# Patient Record
Sex: MALE | Race: ASIAN | NOT HISPANIC OR LATINO | URBAN - METROPOLITAN AREA
[De-identification: names, ages, dates, MRNs, and addresses within clinical notes are randomized per-mention and may not be internally consistent; named-entity substitution may affect disease eponyms.]

---

## 2018-07-05 ENCOUNTER — EMERGENCY (EMERGENCY)
Facility: HOSPITAL | Age: 53
LOS: 1 days | Discharge: HOME | End: 2018-07-05
Attending: EMERGENCY MEDICINE
Payer: MEDICAID

## 2018-07-05 VITALS
TEMPERATURE: 99 F | HEIGHT: 67 IN | SYSTOLIC BLOOD PRESSURE: 170 MMHG | RESPIRATION RATE: 18 BRPM | DIASTOLIC BLOOD PRESSURE: 89 MMHG | OXYGEN SATURATION: 99 % | WEIGHT: 182.1 LBS | HEART RATE: 92 BPM

## 2018-07-05 PROCEDURE — 99282 EMERGENCY DEPT VISIT SF MDM: CPT

## 2018-07-05 PROCEDURE — 99284 EMERGENCY DEPT VISIT MOD MDM: CPT

## 2018-07-05 NOTE — ED PROVIDER NOTE - CARE PLAN
Principal Discharge DX:	Vitreous detachment, unspecified laterality  Assessment and plan of treatment:	You were found to have a vitreous detachment. Please call [phone] to make an appointment for follow up at the ophthalmology clinic.   If your vision worsens, you start having pain in the eye, difficulty opening/closing the eye, extreme headache or you have any other concerns please come back to the emergency room.

## 2018-07-05 NOTE — ED PROVIDER NOTE - NS ED ROS FT
General: denies fever, chills  HENT: denies nasal congestion, sore throat,   Eyes: +blurred vision, No eye discharge, eye redness  Neck: denies neck pain, neck swelling  CV: denies chest pain, palpitations  Resp: denies difficulty breathing, cough  Abdominal: denies nausea, vomiting, diarrhea, abdominal pain, blood in stool, dark stool  MSK: denies muscle aches, bony pain, leg pain, leg swelling  Neuro: denies headaches, numbness, tingling, dizziness, lightheadedness.  Skin: denies rashes, cuts, bruises  Hematologic: denies unexplained bruises

## 2018-07-05 NOTE — ED PROVIDER NOTE - ATTENDING CONTRIBUTION TO CARE
Attending MD Quiros: I personally have seen and examined this patient.  Resident note reviewed and agree on plan of care and except where noted.  See below for details.     53M with PMH including HTN, HLD, glaucoma presents to the ED with L eye blurry vision since yesterday.  Reports intermittent blurry vision, with some pain behind the eye.  Denies trauma, redness, pruritis, sick contacts, diplopia, sudden loss of vision.  On exam, PERRL, EOMI, confrontational VF full, Va cc OD 20/30, OS 20/25, no periorbital ecchymosis, no tenderness to palpation of orbital rim, lid margins clear, no retained foreign body on lid eversion, no conjunctival injection, no corneal defect, AC no cells or flare, fundus exam very limited, disc margins sharp and flat; A/P: 53M with intermittent blurry vision of L eyes, Ddx includes vitreous detachment, vitreous hemorrhage, lower suspciion for retinal detachment but will consult ophtho

## 2018-07-05 NOTE — ED PROVIDER NOTE - PLAN OF CARE
You were found to have a vitreous detachment. Please call [phone] to make an appointment for follow up at the ophthalmology clinic.   If your vision worsens, you start having pain in the eye, difficulty opening/closing the eye, extreme headache or you have any other concerns please come back to the emergency room.

## 2018-07-05 NOTE — ED ADULT TRIAGE NOTE - CHIEF COMPLAINT QUOTE
c.o left eye blurriness and pain since yesterday. pain is sharp and shooting inside the eye. patient has foggy vision.  called onomatologist  office today, was told to come into the ER.

## 2018-07-05 NOTE — ED PROVIDER NOTE - PHYSICAL EXAMINATION
Vital Signs Last 24 Hrs  T(C): 37.1 (05 Jul 2018 14:29), Max: 37.1 (05 Jul 2018 14:29)  T(F): 98.7 (05 Jul 2018 14:29), Max: 98.7 (05 Jul 2018 14:29)  HR: 92 (05 Jul 2018 14:29) (92 - 92)  BP: 170/89 (05 Jul 2018 14:29) (170/89 - 170/89)  BP(mean): --  RR: 18 (05 Jul 2018 14:29) (18 - 18)  SpO2: 99% (05 Jul 2018 14:29) (99% - 99%)    PE  General: comfortable, NAD  HEENT: EOMI, PERRLA, 20/40 acuity in R eye and 20/50 in L eye. Visual fields intact. R light reflex present b/l.  Cardiovascular: Regular rhythm  Respiratory: lungs CTAB  Abdominal: Soft, nontender  Extremities: FROM x4

## 2018-07-05 NOTE — CONSULT NOTE ADULT - SUBJECTIVE AND OBJECTIVE BOX
[facility] Ophthalmology Consult Note    HPI: 53 y.o M PMH glaucoma, HTN, HLD presents c.o L eye blurred vision in the mornings since yesterday with residual floaters OS. Patient denies trauma. Denies pain or redness. Denies diplopia. Denies flashes.      PMH: above  POcHx:  Denies surgeries, lasers or trauma, follows with an ophthalmologist for glc  Meds: reviewed   Allergies: NKDA    ROS:  General (neg), Vision (per HPI), Head and Neck (neg), Pulm (neg), CV (neg), GI (neg),  (neg), Musculoskeletal (neg), Skin/Integ (neg), Neuro (neg), Endocrine (neg), Heme (neg), All/Immuno (neg)    Mood and Affect Appropriate ( x ),  Oriented to Time, Place, and Person x 3 ( x )    Ophthalmology Exam    Visual acuity (cc): 20/25 OU  Pupils: PERRL OU, no APD  Ttono: 18 OU  Extraocular movements (EOMs): Full OU, no pain, no diplopia   Confrontational Visual Field (CVF):  Full OU  Color Plates: 12/12 OU    SLE:  External:  Flat  Lids/Lashes/Lacrimal Ducts: WNL OU  Sclera/ Conj: W+Q OU  Cornea: Clear OU  Anterior Chamber: D+F OU  Iris:  Flat OU  Lens:  Cl OU    Fundus Exam: dilated with 1% tropicamide and 2.5% phenylephrine  Approval obtained from primary team for dilation  Patient aware that pupils can remained dilated for at least 4-6 hours  Exam performed with 20D lens    Vitreous: wnl OU, neg Felix's sign   Disc, cup/disc: sharp and pink, 0.45 OU  Macula:  wnl OU  Vessels:  wnl OU  Periphery: wnl OU; flat 360 with scleral depression OU      A/P. 53 y.o M with floaters OS. Exam notable for excellent Va. IOP wnl. Neg felix's signs and retina flat 360 with SD. No PVD noted with exam using 20 lens. Likely 2/2 PVD OS  - RD precautions discussed     Follow-Up:  Patient should follow up his/her ophthalmologist or in the [facility] within 4-6 week of discharge, sooner if symptoms worsen or change.  [address] [place] [address]  [phone]    s/d/w Dr. Hollis

## 2018-07-05 NOTE — ED PROVIDER NOTE - MEDICAL DECISION MAKING DETAILS
53M PMH HTN, HLD, Glaucoma p/w floaters and blurred vision in L eye x 1 day. Concerning for vitreous detachment. Ophtho consult.

## 2018-07-05 NOTE — ED PROVIDER NOTE - OBJECTIVE STATEMENT
53M PMH glaucoma, HTN, HLD presents for L eye blurred vision since yesterday. Blurred vision began yesterday when he got out of the shower and comes and goes. He reports that he had a black floater in eye. he reports some pain behind the eye. 53M PMH glaucoma, HTN, HLD presents for L eye blurred vision since yesterday. Blurred vision began yesterday when he got out of the shower and comes and goes. He reports that he had a black floater in eye. he reports some pain behind the eye. No HA, ear pain, nausea, vomiting. No redness. Does not recall any injury to eye.

## 2018-07-05 NOTE — ED ADULT NURSE NOTE - OBJECTIVE STATEMENT
53 year old male presents ambulatory to ED through waiting room complaining of left eye pain and blurriness since waking up yesterday. States that he has been taking Latanoprost othalmic solution drops (which he has been taking for over a year for "high pressure in my eye". States pain is sharp and associated with "foggy" vision. Mild redness. wears reading glasses. denies use of contacts. Denies HA, dizziness, fevers, chills.

## 2018-10-10 ENCOUNTER — APPOINTMENT (OUTPATIENT)
Dept: UROLOGY | Facility: CLINIC | Age: 53
End: 2018-10-10
Payer: MEDICAID

## 2018-10-10 VITALS
RESPIRATION RATE: 16 BRPM | DIASTOLIC BLOOD PRESSURE: 120 MMHG | HEART RATE: 73 BPM | SYSTOLIC BLOOD PRESSURE: 171 MMHG | TEMPERATURE: 98.5 F

## 2018-10-10 DIAGNOSIS — N28.89 OTHER SPECIFIED DISORDERS OF KIDNEY AND URETER: ICD-10-CM

## 2018-10-10 PROCEDURE — 99203 OFFICE O/P NEW LOW 30 MIN: CPT

## 2018-10-12 LAB
CREAT SERPL-MCNC: 0.97 MG/DL
PSA SERPL-MCNC: 1.44 NG/ML

## 2018-10-19 ENCOUNTER — FORM ENCOUNTER (OUTPATIENT)
Age: 53
End: 2018-10-19

## 2018-10-20 ENCOUNTER — OUTPATIENT (OUTPATIENT)
Dept: OUTPATIENT SERVICES | Facility: HOSPITAL | Age: 53
LOS: 1 days | End: 2018-10-20
Payer: MEDICAID

## 2018-10-20 ENCOUNTER — APPOINTMENT (OUTPATIENT)
Dept: MRI IMAGING | Facility: IMAGING CENTER | Age: 53
End: 2018-10-20
Payer: MEDICAID

## 2018-10-20 DIAGNOSIS — N28.89 OTHER SPECIFIED DISORDERS OF KIDNEY AND URETER: ICD-10-CM

## 2018-10-20 DIAGNOSIS — N28.1 CYST OF KIDNEY, ACQUIRED: ICD-10-CM

## 2018-10-20 PROCEDURE — 74183 MRI ABD W/O CNTR FLWD CNTR: CPT | Mod: 26

## 2018-10-20 PROCEDURE — A9585: CPT

## 2018-10-20 PROCEDURE — 82565 ASSAY OF CREATININE: CPT

## 2018-10-20 PROCEDURE — 74183 MRI ABD W/O CNTR FLWD CNTR: CPT

## 2018-10-24 ENCOUNTER — APPOINTMENT (OUTPATIENT)
Dept: UROLOGY | Facility: CLINIC | Age: 53
End: 2018-10-24
Payer: MEDICAID

## 2018-10-24 VITALS — HEIGHT: 67 IN | WEIGHT: 178 LBS | BODY MASS INDEX: 27.94 KG/M2

## 2018-10-24 DIAGNOSIS — N28.1 CYST OF KIDNEY, ACQUIRED: ICD-10-CM

## 2018-10-24 DIAGNOSIS — R35.0 FREQUENCY OF MICTURITION: ICD-10-CM

## 2018-10-24 PROCEDURE — 51798 US URINE CAPACITY MEASURE: CPT

## 2018-10-24 PROCEDURE — 99213 OFFICE O/P EST LOW 20 MIN: CPT | Mod: 25

## 2018-11-07 ENCOUNTER — APPOINTMENT (OUTPATIENT)
Dept: UROLOGY | Facility: CLINIC | Age: 53
End: 2018-11-07
Payer: MEDICAID

## 2018-11-07 DIAGNOSIS — Q53.111 UNILATERAL INTRAABDOMINAL TESTIS: ICD-10-CM

## 2018-11-07 PROCEDURE — 99213 OFFICE O/P EST LOW 20 MIN: CPT

## 2018-11-15 ENCOUNTER — MOBILE ON CALL (OUTPATIENT)
Age: 53
End: 2018-11-15

## 2018-11-18 ENCOUNTER — APPOINTMENT (OUTPATIENT)
Dept: MRI IMAGING | Facility: IMAGING CENTER | Age: 53
End: 2018-11-18

## 2018-12-01 ENCOUNTER — FORM ENCOUNTER (OUTPATIENT)
Age: 53
End: 2018-12-01

## 2018-12-02 ENCOUNTER — OUTPATIENT (OUTPATIENT)
Dept: OUTPATIENT SERVICES | Facility: HOSPITAL | Age: 53
LOS: 1 days | End: 2018-12-02
Payer: MEDICAID

## 2018-12-02 ENCOUNTER — APPOINTMENT (OUTPATIENT)
Dept: MRI IMAGING | Facility: IMAGING CENTER | Age: 53
End: 2018-12-02
Payer: MEDICAID

## 2018-12-02 DIAGNOSIS — Q53.111 UNILATERAL INTRAABDOMINAL TESTIS: ICD-10-CM

## 2018-12-02 PROCEDURE — 82565 ASSAY OF CREATININE: CPT

## 2018-12-02 PROCEDURE — 72197 MRI PELVIS W/O & W/DYE: CPT

## 2018-12-02 PROCEDURE — A9585: CPT

## 2018-12-02 PROCEDURE — 72197 MRI PELVIS W/O & W/DYE: CPT | Mod: 26

## 2019-03-22 ENCOUNTER — OUTPATIENT (OUTPATIENT)
Dept: OUTPATIENT SERVICES | Facility: HOSPITAL | Age: 54
LOS: 1 days | End: 2019-03-22

## 2020-12-16 ENCOUNTER — EMERGENCY (EMERGENCY)
Facility: HOSPITAL | Age: 55
LOS: 1 days | Discharge: HOME | End: 2020-12-16
Attending: EMERGENCY MEDICINE
Payer: MEDICAID

## 2020-12-16 VITALS
SYSTOLIC BLOOD PRESSURE: 173 MMHG | OXYGEN SATURATION: 98 % | TEMPERATURE: 98 F | HEIGHT: 67 IN | HEART RATE: 84 BPM | WEIGHT: 179.9 LBS | RESPIRATION RATE: 16 BRPM | DIASTOLIC BLOOD PRESSURE: 110 MMHG

## 2020-12-16 VITALS
TEMPERATURE: 98 F | SYSTOLIC BLOOD PRESSURE: 147 MMHG | DIASTOLIC BLOOD PRESSURE: 103 MMHG | OXYGEN SATURATION: 99 % | HEART RATE: 98 BPM | RESPIRATION RATE: 19 BRPM

## 2020-12-16 LAB
ALBUMIN SERPL ELPH-MCNC: 4.6 G/DL — SIGNIFICANT CHANGE UP (ref 3.3–5)
ALP SERPL-CCNC: 47 U/L — SIGNIFICANT CHANGE UP (ref 40–120)
ALT FLD-CCNC: 25 U/L — SIGNIFICANT CHANGE UP (ref 10–45)
ANION GAP SERPL CALC-SCNC: 13 MMOL/L — SIGNIFICANT CHANGE UP (ref 5–17)
APPEARANCE UR: ABNORMAL
AST SERPL-CCNC: 18 U/L — SIGNIFICANT CHANGE UP (ref 10–40)
BACTERIA # UR AUTO: NEGATIVE — SIGNIFICANT CHANGE UP
BASE EXCESS BLDV CALC-SCNC: 1 MMOL/L — SIGNIFICANT CHANGE UP (ref -2–2)
BASOPHILS # BLD AUTO: 0 K/UL — SIGNIFICANT CHANGE UP (ref 0–0.2)
BASOPHILS NFR BLD AUTO: 0 % — SIGNIFICANT CHANGE UP (ref 0–2)
BILIRUB SERPL-MCNC: 0.4 MG/DL — SIGNIFICANT CHANGE UP (ref 0.2–1.2)
BILIRUB UR-MCNC: NEGATIVE — SIGNIFICANT CHANGE UP
BUN SERPL-MCNC: 20 MG/DL — SIGNIFICANT CHANGE UP (ref 7–23)
CA-I SERPL-SCNC: 1.22 MMOL/L — SIGNIFICANT CHANGE UP (ref 1.12–1.3)
CALCIUM SERPL-MCNC: 10.3 MG/DL — SIGNIFICANT CHANGE UP (ref 8.4–10.5)
CHLORIDE BLDV-SCNC: 107 MMOL/L — SIGNIFICANT CHANGE UP (ref 96–108)
CHLORIDE SERPL-SCNC: 102 MMOL/L — SIGNIFICANT CHANGE UP (ref 96–108)
CO2 BLDV-SCNC: 26 MMOL/L — SIGNIFICANT CHANGE UP (ref 22–30)
CO2 SERPL-SCNC: 22 MMOL/L — SIGNIFICANT CHANGE UP (ref 22–31)
COLOR SPEC: COLORLESS — SIGNIFICANT CHANGE UP
CREAT SERPL-MCNC: 1.06 MG/DL — SIGNIFICANT CHANGE UP (ref 0.5–1.3)
DIFF PNL FLD: NEGATIVE — SIGNIFICANT CHANGE UP
EOSINOPHIL # BLD AUTO: 0 K/UL — SIGNIFICANT CHANGE UP (ref 0–0.5)
EOSINOPHIL NFR BLD AUTO: 0 % — SIGNIFICANT CHANGE UP (ref 0–6)
EPI CELLS # UR: 0 /HPF — SIGNIFICANT CHANGE UP
GAS PNL BLDV: 138 MMOL/L — SIGNIFICANT CHANGE UP (ref 135–145)
GAS PNL BLDV: SIGNIFICANT CHANGE UP
GAS PNL BLDV: SIGNIFICANT CHANGE UP
GIANT PLATELETS BLD QL SMEAR: PRESENT — SIGNIFICANT CHANGE UP
GLUCOSE BLDV-MCNC: 119 MG/DL — HIGH (ref 70–99)
GLUCOSE SERPL-MCNC: 120 MG/DL — HIGH (ref 70–99)
GLUCOSE UR QL: NEGATIVE — SIGNIFICANT CHANGE UP
HCO3 BLDV-SCNC: 24 MMOL/L — SIGNIFICANT CHANGE UP (ref 21–29)
HCT VFR BLD CALC: 38 % — LOW (ref 39–50)
HCT VFR BLDA CALC: 40 % — SIGNIFICANT CHANGE UP (ref 39–50)
HGB BLD CALC-MCNC: 13 G/DL — SIGNIFICANT CHANGE UP (ref 13–17)
HGB BLD-MCNC: 12.5 G/DL — LOW (ref 13–17)
HYALINE CASTS # UR AUTO: 0 /LPF — SIGNIFICANT CHANGE UP (ref 0–2)
KETONES UR-MCNC: NEGATIVE — SIGNIFICANT CHANGE UP
LACTATE BLDV-MCNC: 2.6 MMOL/L — HIGH (ref 0.7–2)
LACTATE BLDV-MCNC: 2.6 MMOL/L — HIGH (ref 0.7–2)
LEUKOCYTE ESTERASE UR-ACNC: NEGATIVE — SIGNIFICANT CHANGE UP
LIDOCAIN IGE QN: 51 U/L — SIGNIFICANT CHANGE UP (ref 7–60)
LYMPHOCYTES # BLD AUTO: 2.8 K/UL — SIGNIFICANT CHANGE UP (ref 1–3.3)
LYMPHOCYTES # BLD AUTO: 30.1 % — SIGNIFICANT CHANGE UP (ref 13–44)
MANUAL SMEAR VERIFICATION: SIGNIFICANT CHANGE UP
MCHC RBC-ENTMCNC: 23.9 PG — LOW (ref 27–34)
MCHC RBC-ENTMCNC: 32.9 GM/DL — SIGNIFICANT CHANGE UP (ref 32–36)
MCV RBC AUTO: 72.7 FL — LOW (ref 80–100)
MONOCYTES # BLD AUTO: 0.58 K/UL — SIGNIFICANT CHANGE UP (ref 0–0.9)
MONOCYTES NFR BLD AUTO: 6.2 % — SIGNIFICANT CHANGE UP (ref 2–14)
NEUTROPHILS # BLD AUTO: 5.92 K/UL — SIGNIFICANT CHANGE UP (ref 1.8–7.4)
NEUTROPHILS NFR BLD AUTO: 63.7 % — SIGNIFICANT CHANGE UP (ref 43–77)
NITRITE UR-MCNC: NEGATIVE — SIGNIFICANT CHANGE UP
PCO2 BLDV: 37 MMHG — SIGNIFICANT CHANGE UP (ref 35–50)
PH BLDV: 7.44 — SIGNIFICANT CHANGE UP (ref 7.35–7.45)
PH UR: 7.5 — SIGNIFICANT CHANGE UP (ref 5–8)
PLAT MORPH BLD: ABNORMAL
PLATELET # BLD AUTO: 216 K/UL — SIGNIFICANT CHANGE UP (ref 150–400)
PO2 BLDV: 30 MMHG — SIGNIFICANT CHANGE UP (ref 25–45)
POIKILOCYTOSIS BLD QL AUTO: SLIGHT — SIGNIFICANT CHANGE UP
POTASSIUM BLDV-SCNC: 4 MMOL/L — SIGNIFICANT CHANGE UP (ref 3.5–5.3)
POTASSIUM SERPL-MCNC: 3.8 MMOL/L — SIGNIFICANT CHANGE UP (ref 3.5–5.3)
POTASSIUM SERPL-SCNC: 3.8 MMOL/L — SIGNIFICANT CHANGE UP (ref 3.5–5.3)
PROT SERPL-MCNC: 7.6 G/DL — SIGNIFICANT CHANGE UP (ref 6–8.3)
PROT UR-MCNC: NEGATIVE — SIGNIFICANT CHANGE UP
RBC # BLD: 5.23 M/UL — SIGNIFICANT CHANGE UP (ref 4.2–5.8)
RBC # FLD: 14.3 % — SIGNIFICANT CHANGE UP (ref 10.3–14.5)
RBC BLD AUTO: SIGNIFICANT CHANGE UP
RBC CASTS # UR COMP ASSIST: 2 /HPF — SIGNIFICANT CHANGE UP (ref 0–4)
SAO2 % BLDV: 55 % — LOW (ref 67–88)
SODIUM SERPL-SCNC: 137 MMOL/L — SIGNIFICANT CHANGE UP (ref 135–145)
SP GR SPEC: 1.01 — SIGNIFICANT CHANGE UP (ref 1.01–1.02)
UROBILINOGEN FLD QL: NEGATIVE — SIGNIFICANT CHANGE UP
WBC # BLD: 9.29 K/UL — SIGNIFICANT CHANGE UP (ref 3.8–10.5)
WBC # FLD AUTO: 9.29 K/UL — SIGNIFICANT CHANGE UP (ref 3.8–10.5)
WBC UR QL: 0 /HPF — SIGNIFICANT CHANGE UP (ref 0–5)

## 2020-12-16 PROCEDURE — 74176 CT ABD & PELVIS W/O CONTRAST: CPT

## 2020-12-16 PROCEDURE — 87086 URINE CULTURE/COLONY COUNT: CPT

## 2020-12-16 PROCEDURE — 80053 COMPREHEN METABOLIC PANEL: CPT

## 2020-12-16 PROCEDURE — 85014 HEMATOCRIT: CPT

## 2020-12-16 PROCEDURE — 82947 ASSAY GLUCOSE BLOOD QUANT: CPT

## 2020-12-16 PROCEDURE — 82330 ASSAY OF CALCIUM: CPT

## 2020-12-16 PROCEDURE — 96374 THER/PROPH/DIAG INJ IV PUSH: CPT

## 2020-12-16 PROCEDURE — 99285 EMERGENCY DEPT VISIT HI MDM: CPT

## 2020-12-16 PROCEDURE — 83605 ASSAY OF LACTIC ACID: CPT

## 2020-12-16 PROCEDURE — 74176 CT ABD & PELVIS W/O CONTRAST: CPT | Mod: 26

## 2020-12-16 PROCEDURE — 83690 ASSAY OF LIPASE: CPT

## 2020-12-16 PROCEDURE — 82803 BLOOD GASES ANY COMBINATION: CPT

## 2020-12-16 PROCEDURE — 82435 ASSAY OF BLOOD CHLORIDE: CPT

## 2020-12-16 PROCEDURE — 84132 ASSAY OF SERUM POTASSIUM: CPT

## 2020-12-16 PROCEDURE — 81001 URINALYSIS AUTO W/SCOPE: CPT

## 2020-12-16 PROCEDURE — 84295 ASSAY OF SERUM SODIUM: CPT

## 2020-12-16 PROCEDURE — 96361 HYDRATE IV INFUSION ADD-ON: CPT

## 2020-12-16 PROCEDURE — 99284 EMERGENCY DEPT VISIT MOD MDM: CPT | Mod: 25

## 2020-12-16 PROCEDURE — 96375 TX/PRO/DX INJ NEW DRUG ADDON: CPT

## 2020-12-16 PROCEDURE — 85025 COMPLETE CBC W/AUTO DIFF WBC: CPT

## 2020-12-16 PROCEDURE — 85018 HEMOGLOBIN: CPT

## 2020-12-16 RX ORDER — TAMSULOSIN HYDROCHLORIDE 0.4 MG/1
1 CAPSULE ORAL
Qty: 7 | Refills: 0
Start: 2020-12-16 | End: 2020-12-22

## 2020-12-16 RX ORDER — IBUPROFEN 200 MG
1 TABLET ORAL
Qty: 9 | Refills: 0
Start: 2020-12-16 | End: 2020-12-18

## 2020-12-16 RX ORDER — MORPHINE SULFATE 50 MG/1
4 CAPSULE, EXTENDED RELEASE ORAL ONCE
Refills: 0 | Status: DISCONTINUED | OUTPATIENT
Start: 2020-12-16 | End: 2020-12-16

## 2020-12-16 RX ORDER — SODIUM CHLORIDE 9 MG/ML
1000 INJECTION INTRAMUSCULAR; INTRAVENOUS; SUBCUTANEOUS ONCE
Refills: 0 | Status: DISCONTINUED | OUTPATIENT
Start: 2020-12-16 | End: 2020-12-16

## 2020-12-16 RX ORDER — KETOROLAC TROMETHAMINE 30 MG/ML
15 SYRINGE (ML) INJECTION ONCE
Refills: 0 | Status: DISCONTINUED | OUTPATIENT
Start: 2020-12-16 | End: 2020-12-16

## 2020-12-16 RX ORDER — ACETAMINOPHEN 500 MG
975 TABLET ORAL ONCE
Refills: 0 | Status: COMPLETED | OUTPATIENT
Start: 2020-12-16 | End: 2020-12-16

## 2020-12-16 RX ORDER — SODIUM CHLORIDE 9 MG/ML
1000 INJECTION, SOLUTION INTRAVENOUS ONCE
Refills: 0 | Status: COMPLETED | OUTPATIENT
Start: 2020-12-16 | End: 2020-12-16

## 2020-12-16 RX ORDER — ONDANSETRON 8 MG/1
4 TABLET, FILM COATED ORAL ONCE
Refills: 0 | Status: COMPLETED | OUTPATIENT
Start: 2020-12-16 | End: 2020-12-16

## 2020-12-16 RX ADMIN — MORPHINE SULFATE 4 MILLIGRAM(S): 50 CAPSULE, EXTENDED RELEASE ORAL at 08:45

## 2020-12-16 RX ADMIN — MORPHINE SULFATE 4 MILLIGRAM(S): 50 CAPSULE, EXTENDED RELEASE ORAL at 08:00

## 2020-12-16 RX ADMIN — Medication 15 MILLIGRAM(S): at 08:45

## 2020-12-16 RX ADMIN — Medication 15 MILLIGRAM(S): at 11:23

## 2020-12-16 RX ADMIN — SODIUM CHLORIDE 1000 MILLILITER(S): 9 INJECTION, SOLUTION INTRAVENOUS at 08:45

## 2020-12-16 RX ADMIN — SODIUM CHLORIDE 1000 MILLILITER(S): 9 INJECTION, SOLUTION INTRAVENOUS at 07:54

## 2020-12-16 RX ADMIN — ONDANSETRON 4 MILLIGRAM(S): 8 TABLET, FILM COATED ORAL at 08:00

## 2020-12-16 RX ADMIN — SODIUM CHLORIDE 1000 MILLILITER(S): 9 INJECTION, SOLUTION INTRAVENOUS at 09:50

## 2020-12-16 RX ADMIN — SODIUM CHLORIDE 1000 MILLILITER(S): 9 INJECTION, SOLUTION INTRAVENOUS at 08:48

## 2020-12-16 NOTE — ED PROVIDER NOTE - PROGRESS NOTE DETAILS
CT findings noted. Patient with episode of emesis and worsening pain. Morphine/Zofran given at this time. Urology consulted. Urology noting no acute intervention at this time but patient can follow up outpatient with clinic. Patient pain improved. Tolerating PO. stable for discharge at this time. Patient to follow up with urology and patient verbalizes understanding of plan for follow up. Strict return precautions given. Motrin/Tamsulosin sent to pharmacy.

## 2020-12-16 NOTE — ED PROVIDER NOTE - CARE PLAN
Principal Discharge DX:	Left flank pain  Assessment and plan of treatment:	55 M with hx as above presenting for left flank pain starting today. Given hx and findings concern for pyelonephritis vs nephrolithiasis at this time. Will get labs, UA, CT and pain control at this time.   Principal Discharge DX:	Ureteral stone with hydronephrosis  Assessment and plan of treatment:	55 M with hx as above presenting for left flank pain starting today. Given hx and findings concern for pyelonephritis vs nephrolithiasis at this time. Will get labs, UA, CT and pain control at this time.

## 2020-12-16 NOTE — ED PROVIDER NOTE - PHYSICAL EXAMINATION
· Physical Examination: PHYSICAL EXAM:   · CONSTITUTIONAL:  Appearance: uncomfortable appearing.  Development: well developed.    · Manner: appropriate for situation.  Mentation: awake, alert, oriented to person, place, time/situation  · Mood: appropriate.  Nourishment: well  · Head Shape: normal cephalic, ATRAUMATIC  · EYES: bilateral normal, no discharge, redness or evidence of any abnormality  · Nose: clear Mouth: normal mucosa  · Throat: uvula midline, no vesicles, no redness, and no oropharyngeal exudate.  · CARDIAC:  CARDIAC RHYTHM: regular  CARDIAC RATE: normal  CARDIAC PEDAL EDEMA: absent  CARDIAC JVD: non-distended bilaterally  · CARDIAC PULSES: normal bilaterally  · RESPIRATORY:  Respiratory Distress: no  Breath Sounds: normal  · Chest Exam: normal, non-tender  · Abdominal Exam: soft, nondistended, nontender. Left CVA ttp. Negative leroy sign.   · MUSCULOSKELETAL: Spine appears normal, range of motion is not limited, no muscle or joint tenderness  · NEUROLOGICAL: Alert and oriented, no focal deficits, no motor or sensory deficits.  · SKIN: Skin normal color for race, warm, dry and intact. No evidence of rash.  · PSYCHIATRIC: Alert and oriented to person, place, time/situation. normal mood and affect. no apparent risk to self or others.  · HEME LYMPH: No adenopathy or splenomegaly. No cervical or inguinal lymphadenopathy.

## 2020-12-16 NOTE — ED PROVIDER NOTE - PATIENT PORTAL LINK FT
You can access the FollowMyHealth Patient Portal offered by [facility] by registering at the following website: [email or web address]. By joining [employer]’s FollowMyHealth portal, you will also be able to view your health information using other applications (apps) compatible with our system.

## 2020-12-16 NOTE — ED ADULT NURSE NOTE - NSFALLRSKUNASSIST_ED_ALL_ED
Changed Hussain to Metoprolol due to shortage on Atenolol. Discussed change with pharmacist and Hussain. Hussain will check his blood pressure after starting this new med and notify me of any concerns.  He would like to transition to our [facility] for Transplant care.     no

## 2020-12-16 NOTE — ED PROVIDER NOTE - NSFOLLOWUPINSTRUCTIONS_ED_ALL_ED_FT
You were evaluated in the Emergency Department for left flank pain.  You were evaluated and examined by a physician, and you had labs and CT scan.     Based on your evaluation: Kidney stone     There are no signs of emergency conditions requiring admission to the hospital on today's workup.  Based on the evaluation, a presumptive diagnosis was made, however, further evaluation may be required by your primary care physician or a specialist for a more definitive diagnosis.  Therefore, please follow-up as directed or return to the Emergency Department if your symptoms change or worsen.    We recommend that you:  1. See your primary care physician within the next 72 hours for follow up.  Bring a copy of your discharge paperwork (including any test results) to your doctor.  2. please see urologist listed below within next 72 hours.   3. Please take motrin for pain control every 8 hours.   4. Please take tamsulosin nightly as prescribed.       *** Return immediately if you have inability to tolerate food or water consumption, or any other new/concerning symptoms. ***

## 2020-12-16 NOTE — ED PROVIDER NOTE - OBJECTIVE STATEMENT
The patient is a 55 M with hx of HTN presenting for left sided sharp radiating to left side pain x 3 hours. Symptoms started suddenly at rest, have been constant and worsening, with no worsening or alleviating factors. No medications taken. Patient notes associated nausea and 1 week worth of urinary frequency/urgency. Patient denies chest pain, sob, fever, chills, headache, emesis, diarrhea, abdominal pain, hematuria/dysuria or lower extremity swelling. No hx of nephrolithiasis.

## 2020-12-16 NOTE — ED PROVIDER NOTE - NS ED ROS FT
Constitutional: No fever or chills  Eyes: No visual changes, eye pain or redness  HEENT: No throat pain, ear pain, nasal pain. No nose bleeding.  CV: No chest pain or lower extremity edema  Resp: No SOB no cough  GI: (+) abd pain. (+) nausea. No vomiting. No diarrhea. No constipation.   : (+) urinary frequency/urgency. No dysuria, hematuria.   MSK: No musculoskeletal pain  Skin: No rash  Neuro: No headache. No numbness or tingling. No weakness.

## 2020-12-16 NOTE — ED PROVIDER NOTE - NSFOLLOWUPCLINICS_GEN_ALL_ED_FT
[facility]  Urology  [address] [place] [address] - [address] [place] [address]  Phone: [phone]  Fax:   Follow Up Time: 1-3 Days

## 2020-12-16 NOTE — ED PROVIDER NOTE - CLINICAL SUMMARY MEDICAL DECISION MAKING FREE TEXT BOX
Attending MD Fuentes: Pt with flank pain, benign abd exam, suspected ureteral colic. Plan for CT a/p to confirm ureteral stone, screening labs, UA, analgesia and reassess       *The above represents an initial assessment/impression. Please refer to progress notes for potential changes in patient clinical course*

## 2020-12-16 NOTE — ED PROVIDER NOTE - ATTENDING CONTRIBUTION TO CARE
Attending MD Fuentes:  I personally have seen and examined this patient.  Resident note reviewed and agree on plan of care and except where noted.  See HPI, PE, and MDM for details.

## 2020-12-16 NOTE — ED PROVIDER NOTE - PLAN OF CARE
55 M with hx as above presenting for left flank pain starting today. Given hx and findings concern for pyelonephritis vs nephrolithiasis at this time. Will get labs, UA, CT and pain control at this time.

## 2020-12-16 NOTE — ED ADULT NURSE NOTE - OBJECTIVE STATEMENT
54 y/o M, PMH HTN, presents to ED for sudden onset of L flank pain that woke him from sleep with nausea/vomiting x 1 since 0430, pt has not taken any meds for the pain. Reports 1 week of urinary urgency/frequency, denies fevers, chills. No hx kidney stones. Pain 8/10 on arrival to ED, pt vomited x 1 after taking sip of water for Tylenol, MD aware and pt re-medicated as per orders. Safety maintained, call bell within reach.

## 2020-12-17 LAB
CULTURE RESULTS: NO GROWTH — SIGNIFICANT CHANGE UP
SPECIMEN SOURCE: SIGNIFICANT CHANGE UP

## 2020-12-23 ENCOUNTER — APPOINTMENT (OUTPATIENT)
Dept: UROLOGY | Facility: CLINIC | Age: 55
End: 2020-12-23
Payer: MEDICAID

## 2020-12-23 PROCEDURE — 99214 OFFICE O/P EST MOD 30 MIN: CPT | Mod: 95

## 2021-01-12 NOTE — HISTORY OF PRESENT ILLNESS
[Home] : at home, [unfilled] , at the time of the visit. [Other Location: e.g. Home (Enter Location, City,State)___] : at [unfilled] [Friend] : friend [FreeTextEntry1] : Pt.had an episode of renal colic on Dec 16th and went to ER at [facility]\par CT scan showed 4 mm stone at left  UVJ\par At present pain free . \par Uncertain whether he passed his stone.\par Initiallt he had marked frequency and nocturia x6\par Now nocturia x1 \par Stream is fairly strong\par Last PSA october 2018 0.97\par He has mild obstructive voiding symptoms but I do not want to suggest treatment until the stone\par  issue has cleared.\par He needs LL and TEB in 3 weeks [Other Location: e.g. School (Enter Location, City,State)___] : at [unfilled], at the time of the visit.

## 2021-01-13 ENCOUNTER — APPOINTMENT (OUTPATIENT)
Dept: UROLOGY | Facility: CLINIC | Age: 56
End: 2021-01-13
Payer: MEDICAID

## 2021-01-13 DIAGNOSIS — N20.0 CALCULUS OF KIDNEY: ICD-10-CM

## 2021-01-13 PROCEDURE — 99214 OFFICE O/P EST MOD 30 MIN: CPT | Mod: 95

## 2021-01-20 LAB — NIDUS STONE QN: NORMAL

## 2021-08-02 ENCOUNTER — OUTPATIENT (OUTPATIENT)
Dept: OUTPATIENT SERVICES | Facility: HOSPITAL | Age: 56
LOS: 1 days | End: 2021-08-02
Payer: MEDICAID

## 2021-08-02 ENCOUNTER — APPOINTMENT (OUTPATIENT)
Dept: UROLOGY | Facility: CLINIC | Age: 56
End: 2021-08-02
Payer: MEDICAID

## 2021-08-02 VITALS
WEIGHT: 187 LBS | HEIGHT: 67 IN | SYSTOLIC BLOOD PRESSURE: 155 MMHG | HEART RATE: 83 BPM | RESPIRATION RATE: 18 BRPM | TEMPERATURE: 98 F | BODY MASS INDEX: 29.35 KG/M2 | DIASTOLIC BLOOD PRESSURE: 99 MMHG

## 2021-08-02 DIAGNOSIS — Z87.442 PERSONAL HISTORY OF URINARY CALCULI: ICD-10-CM

## 2021-08-02 DIAGNOSIS — N13.8 BENIGN PROSTATIC HYPERPLASIA WITH LOWER URINARY TRACT SYMPMS: ICD-10-CM

## 2021-08-02 DIAGNOSIS — R35.0 FREQUENCY OF MICTURITION: ICD-10-CM

## 2021-08-02 DIAGNOSIS — N40.1 BENIGN PROSTATIC HYPERPLASIA WITH LOWER URINARY TRACT SYMPMS: ICD-10-CM

## 2021-08-02 PROCEDURE — 76775 US EXAM ABDO BACK WALL LIM: CPT | Mod: 26

## 2021-08-02 PROCEDURE — 99213 OFFICE O/P EST LOW 20 MIN: CPT

## 2021-08-02 PROCEDURE — 76775 US EXAM ABDO BACK WALL LIM: CPT

## 2021-08-02 NOTE — HISTORY OF PRESENT ILLNESS
[FreeTextEntry1] : The patient is a 56 year old male presenting today for a renal US for a history of kidney stones.\par He reports nocturia one time per night. \par He reports an occasional weak stream, but denies intermittency.\par \par His renal US from 8/2/21 showed:\par Right kidney: 12.3 x 4.8 x 5.3 cm \par Cortical Thickness: UP 2.3 cm , LP 2.0 cm \par \par Left kidney: 11.5 x 4.5 x 4.1 cm\par Cortical Thickness: UP 1.3 cm , LP 1.5 cm \par \par Echogenicity: Normal\par \par Bladder: Not visualized\par \par Findings: There is a 1.5 x 1.6 cm simple cyst in the mid pole and a 1.7 x 1.4 cm cyst with a thin septation in the upper pole of the right kidney, both with no flow. There are two punctate calculi in the left kidney, 2.0 mm lower pole and 4.3 mm upper pole, both with only twinkle artifact. Both kidneys are normal in size and echogenicity without hydronephrosis or solid masses visualized\par \par Blood work today includes prostate Ca screen.\par \par The patient will complete a LL. \par He should also complete a uroflow in one month. \par \par The patient will return to the office in one month to review LL and uroflow study.

## 2021-08-02 NOTE — ASSESSMENT
[FreeTextEntry1] : The patient is a 56 year old male presenting today for a renal US for a history of kidney stones.\par He reports nocturia one time per night. \par He reports an occasional weak stream, but denies intermittency.\par \par His renal US from 8/2/21 showed:\par Right kidney: 12.3 x 4.8 x 5.3 cm \par Cortical Thickness: UP 2.3 cm , LP 2.0 cm \par \par Left kidney: 11.5 x 4.5 x 4.1 cm\par Cortical Thickness: UP 1.3 cm , LP 1.5 cm \par \par Echogenicity: Normal\par \par Bladder: Not visualized\par \par Findings: There is a 1.5 x 1.6 cm simple cyst in the mid pole and a 1.7 x 1.4 cm cyst with a thin septation in the upper pole of the right kidney, both with no flow. There are two punctate calculi in the left kidney, 2.0 mm lower pole and 4.3 mm upper pole, both with only twinkle artifact. Both kidneys are normal in size and echogenicity without hydronephrosis or solid masses visualized\par \par Blood work today includes prostate Ca screen.\par \par The patient will complete a LL. \par He should also complete a uroflow in one month. \par \par The patient will return to the office in one month to review LL and uroflow study. \par \par I spent 15 minutes with the patient.

## 2021-09-10 ENCOUNTER — APPOINTMENT (OUTPATIENT)
Dept: UROLOGY | Facility: CLINIC | Age: 56
End: 2021-09-10
Payer: MEDICAID

## 2021-09-10 PROCEDURE — 51798 US URINE CAPACITY MEASURE: CPT

## 2021-09-10 PROCEDURE — 99212 OFFICE O/P EST SF 10 MIN: CPT

## 2021-09-10 NOTE — HISTORY OF PRESENT ILLNESS
[FreeTextEntry1] : The patient is a 56 year old male presenting today to review LL results and PVR.\par He reports he takes a homeopathic medication from [facility]. \par \par His Litholink from 8/18/21 showed very high calcium, SS CaOx, and low citrate. \par \par His PVR was 0 mL. \par \par I recommend the patient stop taking the medication from [facility] for one month and then complete a LL. \par He should also drink orange juice in the morning and lemonade throughout the day.\par I requested that he brings the homeopathic medication in so I can assess it. \par \par The patient will complete a LL in one month. \par \par The patient will return to the office in 2 months.

## 2021-09-10 NOTE — ASSESSMENT
[FreeTextEntry1] : The patient is a 56 year old male presenting today to review LL results and PVR.\par He reports he takes a homeopathic medication from [facility]. \par \par His Litholink from 8/18/21 showed very high calcium, SS CaOx, and low citrate. \par \par His PVR was 0 mL. \par \par I recommend the patient stop taking the medication from [facility] for one month and then complete a LL. \par He should also drink orange juice in the morning and lemonade throughout the day.\par I requested that he brings the homeopathic medication in so I can assess it. \par \par The patient will complete a LL in one month. \par \par The patient will return to the office in 2 months. \par \par I spent 15 minutes with the patient.

## 2021-12-20 ENCOUNTER — OUTPATIENT (OUTPATIENT)
Dept: OUTPATIENT SERVICES | Facility: HOSPITAL | Age: 56
LOS: 1 days | End: 2021-12-20
Payer: MEDICAID

## 2021-12-20 ENCOUNTER — APPOINTMENT (OUTPATIENT)
Dept: UROLOGY | Facility: CLINIC | Age: 56
End: 2021-12-20
Payer: MEDICAID

## 2021-12-20 VITALS
WEIGHT: 187 LBS | HEIGHT: 67 IN | SYSTOLIC BLOOD PRESSURE: 147 MMHG | BODY MASS INDEX: 29.35 KG/M2 | HEART RATE: 82 BPM | RESPIRATION RATE: 18 BRPM | DIASTOLIC BLOOD PRESSURE: 103 MMHG

## 2021-12-20 DIAGNOSIS — R35.0 FREQUENCY OF MICTURITION: ICD-10-CM

## 2021-12-20 DIAGNOSIS — Z87.442 PERSONAL HISTORY OF URINARY CALCULI: ICD-10-CM

## 2021-12-20 PROCEDURE — 99213 OFFICE O/P EST LOW 20 MIN: CPT

## 2021-12-20 PROCEDURE — 76775 US EXAM ABDO BACK WALL LIM: CPT

## 2021-12-20 PROCEDURE — 76775 US EXAM ABDO BACK WALL LIM: CPT | Mod: 26

## 2021-12-20 RX ORDER — HYDROCHLOROTHIAZIDE 25 MG/1
25 TABLET ORAL DAILY
Qty: 90 | Refills: 0 | Status: ACTIVE | COMMUNITY
Start: 2021-12-20 | End: 1900-01-01

## 2021-12-20 NOTE — ASSESSMENT
[FreeTextEntry1] : The patient is a 56 year old male presenting today for a follow up for kidney stones. \par He reports feeling well urologically. \par \par His LL from 12/6/21 showed very high urinary calcium, SS CaP, SS CaOx, elevated sodium, increased protein intake\par \par His renal US from 12/20/2021 demonstrated: \par Right kidney: 11.7 x 5.0 x 5.4 cm \par Cortical Thickness:1.5 cm UP 1.5 cm LP \par \par Left kidney: 12.5 x 5.2 x 5.1 cm			 \par Cortical Thickness:1.4 cm UP 1.8 cm LP \par  \par Echogenicity: Normal\par \par Bladder: Not visualized \par \par Findings: There is bilateral fullness of the renal pelvises. There is a 2.2 cm x 1.4 cm x 1.4 cm simple cyst in the upper pole of the right kidney. There is a 4.3 mm nonobstructing stone in the upper pole of the left kidney. Both kidneys are normal in size and echogenicity without hydronephrosis or solid masses visualized. \par \par I have encouraged him to continue hydrating well. He should reduce his protein, calcium, and salt intake. \par I am prescribing HCTZ 25 mg, one tablet daily. I advised him to eat a banana daily while taking the medication.\par \par He will complete a LL in 5 months. \par He will have a renal US in 6 months. \par \par The patient will reutrn to the office in 6 months.\par \par I spent 25 minutes with the patient.

## 2021-12-20 NOTE — HISTORY OF PRESENT ILLNESS
[FreeTextEntry1] : The patient is a 56 year old male presenting today for a follow up for kidney stones. \par He reports feeling well urologically. \par \par His LL from 12/6/21 showed very high urinary calcium, SS CaP, SS CaOx, elevated sodium, increased protein intake\par \par His renal US from 12/20/2021 demonstrated: \par Right kidney: 11.7 x 5.0 x 5.4 cm \par Cortical Thickness:1.5 cm UP 1.5 cm LP \par \par Left kidney: 12.5 x 5.2 x 5.1 cm			 \par Cortical Thickness:1.4 cm UP 1.8 cm LP \par  \par Echogenicity: Normal\par \par Bladder: Not visualized \par \par Findings: There is bilateral fullness of the renal pelvises. There is a 2.2 cm x 1.4 cm x 1.4 cm simple cyst in the upper pole of the right kidney. There is a 4.3 mm nonobstructing stone in the upper pole of the left kidney. Both kidneys are normal in size and echogenicity without hydronephrosis or solid masses visualized. \par \par I have encouraged him to continue hydrating well. He should reduce his protein, calcium, and salt intake. \par I am prescribing HCTZ 25 mg, one tablet daily. I advised him to eat a banana daily while taking the medication.\par \par He will complete a LL in 5 months. \par He will have a renal US in 6 months. \par \par The patient will reutrn to the office in 6 months.

## 2021-12-22 LAB — PSA SERPL-MCNC: 1.67 NG/ML

## 2022-06-07 ENCOUNTER — APPOINTMENT (OUTPATIENT)
Dept: UROLOGY | Facility: CLINIC | Age: 57
End: 2022-06-07

## 2023-04-04 ENCOUNTER — EMERGENCY (EMERGENCY)
Facility: HOSPITAL | Age: 58
LOS: 1 days | End: 2023-04-04
Attending: EMERGENCY MEDICINE
Payer: MEDICAID

## 2023-04-04 VITALS
HEART RATE: 78 BPM | DIASTOLIC BLOOD PRESSURE: 94 MMHG | SYSTOLIC BLOOD PRESSURE: 154 MMHG | OXYGEN SATURATION: 97 % | RESPIRATION RATE: 18 BRPM

## 2023-04-04 VITALS
SYSTOLIC BLOOD PRESSURE: 149 MMHG | HEIGHT: 67 IN | OXYGEN SATURATION: 98 % | WEIGHT: 188.5 LBS | RESPIRATION RATE: 18 BRPM | DIASTOLIC BLOOD PRESSURE: 103 MMHG | HEART RATE: 88 BPM | TEMPERATURE: 99 F

## 2023-04-04 LAB
ALBUMIN SERPL ELPH-MCNC: 4 G/DL — SIGNIFICANT CHANGE UP (ref 3.5–5)
ALP SERPL-CCNC: 62 U/L — SIGNIFICANT CHANGE UP (ref 40–120)
ALT FLD-CCNC: 26 U/L DA — SIGNIFICANT CHANGE UP (ref 10–60)
ANION GAP SERPL CALC-SCNC: 4 MMOL/L — LOW (ref 5–17)
ANISOCYTOSIS BLD QL: SLIGHT — SIGNIFICANT CHANGE UP
APPEARANCE UR: CLEAR — SIGNIFICANT CHANGE UP
AST SERPL-CCNC: 16 U/L — SIGNIFICANT CHANGE UP (ref 10–40)
BACTERIA # UR AUTO: ABNORMAL /HPF
BASOPHILS # BLD AUTO: 0.05 K/UL — SIGNIFICANT CHANGE UP (ref 0–0.2)
BASOPHILS NFR BLD AUTO: 0.6 % — SIGNIFICANT CHANGE UP (ref 0–2)
BILIRUB SERPL-MCNC: 0.5 MG/DL — SIGNIFICANT CHANGE UP (ref 0.2–1.2)
BILIRUB UR-MCNC: NEGATIVE — SIGNIFICANT CHANGE UP
BUN SERPL-MCNC: 24 MG/DL — HIGH (ref 7–18)
CALCIUM SERPL-MCNC: 10.4 MG/DL — SIGNIFICANT CHANGE UP (ref 8.4–10.5)
CHLORIDE SERPL-SCNC: 104 MMOL/L — SIGNIFICANT CHANGE UP (ref 96–108)
CO2 SERPL-SCNC: 23 MMOL/L — SIGNIFICANT CHANGE UP (ref 22–31)
COLOR SPEC: YELLOW — SIGNIFICANT CHANGE UP
COMMENT - URINE: SIGNIFICANT CHANGE UP
CREAT SERPL-MCNC: 1.61 MG/DL — HIGH (ref 0.5–1.3)
DIFF PNL FLD: ABNORMAL
EGFR: 49 ML/MIN/1.73M2 — LOW
EOSINOPHIL # BLD AUTO: 0.12 K/UL — SIGNIFICANT CHANGE UP (ref 0–0.5)
EOSINOPHIL NFR BLD AUTO: 1.4 % — SIGNIFICANT CHANGE UP (ref 0–6)
EPI CELLS # UR: ABNORMAL /HPF
GLUCOSE SERPL-MCNC: 111 MG/DL — HIGH (ref 70–99)
GLUCOSE UR QL: NEGATIVE — SIGNIFICANT CHANGE UP
HCT VFR BLD CALC: 44.6 % — SIGNIFICANT CHANGE UP (ref 39–50)
HGB BLD-MCNC: 14.2 G/DL — SIGNIFICANT CHANGE UP (ref 13–17)
IMM GRANULOCYTES NFR BLD AUTO: 0.5 % — SIGNIFICANT CHANGE UP (ref 0–0.9)
KETONES UR-MCNC: NEGATIVE — SIGNIFICANT CHANGE UP
LEUKOCYTE ESTERASE UR-ACNC: ABNORMAL
LIDOCAIN IGE QN: 204 U/L — SIGNIFICANT CHANGE UP (ref 73–393)
LYMPHOCYTES # BLD AUTO: 3.03 K/UL — SIGNIFICANT CHANGE UP (ref 1–3.3)
LYMPHOCYTES # BLD AUTO: 34.5 % — SIGNIFICANT CHANGE UP (ref 13–44)
MANUAL SMEAR VERIFICATION: SIGNIFICANT CHANGE UP
MCHC RBC-ENTMCNC: 23.1 PG — LOW (ref 27–34)
MCHC RBC-ENTMCNC: 31.8 GM/DL — LOW (ref 32–36)
MCV RBC AUTO: 72.6 FL — LOW (ref 80–100)
MICROCYTES BLD QL: SLIGHT — SIGNIFICANT CHANGE UP
MONOCYTES # BLD AUTO: 0.79 K/UL — SIGNIFICANT CHANGE UP (ref 0–0.9)
MONOCYTES NFR BLD AUTO: 9 % — SIGNIFICANT CHANGE UP (ref 2–14)
NEUTROPHILS # BLD AUTO: 4.75 K/UL — SIGNIFICANT CHANGE UP (ref 1.8–7.4)
NEUTROPHILS NFR BLD AUTO: 54 % — SIGNIFICANT CHANGE UP (ref 43–77)
NITRITE UR-MCNC: NEGATIVE — SIGNIFICANT CHANGE UP
NRBC # BLD: 0 /100 WBCS — SIGNIFICANT CHANGE UP (ref 0–0)
PH UR: 6 — SIGNIFICANT CHANGE UP (ref 5–8)
PLAT MORPH BLD: NORMAL — SIGNIFICANT CHANGE UP
PLATELET # BLD AUTO: 227 K/UL — SIGNIFICANT CHANGE UP (ref 150–400)
PLATELET COUNT - ESTIMATE: NORMAL — SIGNIFICANT CHANGE UP
POTASSIUM SERPL-MCNC: 4.4 MMOL/L — SIGNIFICANT CHANGE UP (ref 3.5–5.3)
POTASSIUM SERPL-SCNC: 4.4 MMOL/L — SIGNIFICANT CHANGE UP (ref 3.5–5.3)
PROT SERPL-MCNC: 8.4 G/DL — HIGH (ref 6–8.3)
PROT UR-MCNC: 15 MG/DL
RBC # BLD: 6.14 M/UL — HIGH (ref 4.2–5.8)
RBC # FLD: 14.5 % — SIGNIFICANT CHANGE UP (ref 10.3–14.5)
RBC BLD AUTO: ABNORMAL
RBC CASTS # UR COMP ASSIST: ABNORMAL /HPF (ref 0–2)
SODIUM SERPL-SCNC: 131 MMOL/L — LOW (ref 135–145)
SP GR SPEC: 1.01 — SIGNIFICANT CHANGE UP (ref 1.01–1.02)
UROBILINOGEN FLD QL: NEGATIVE — SIGNIFICANT CHANGE UP
WBC # BLD: 8.78 K/UL — SIGNIFICANT CHANGE UP (ref 3.8–10.5)
WBC # FLD AUTO: 8.78 K/UL — SIGNIFICANT CHANGE UP (ref 3.8–10.5)
WBC UR QL: SIGNIFICANT CHANGE UP /HPF (ref 0–5)

## 2023-04-04 PROCEDURE — 36415 COLL VENOUS BLD VENIPUNCTURE: CPT

## 2023-04-04 PROCEDURE — 74176 CT ABD & PELVIS W/O CONTRAST: CPT | Mod: 26,MA

## 2023-04-04 PROCEDURE — 99284 EMERGENCY DEPT VISIT MOD MDM: CPT

## 2023-04-04 PROCEDURE — 96374 THER/PROPH/DIAG INJ IV PUSH: CPT

## 2023-04-04 PROCEDURE — 81001 URINALYSIS AUTO W/SCOPE: CPT

## 2023-04-04 PROCEDURE — 99284 EMERGENCY DEPT VISIT MOD MDM: CPT | Mod: 25

## 2023-04-04 PROCEDURE — 99283 EMERGENCY DEPT VISIT LOW MDM: CPT

## 2023-04-04 PROCEDURE — 87086 URINE CULTURE/COLONY COUNT: CPT

## 2023-04-04 PROCEDURE — 74176 CT ABD & PELVIS W/O CONTRAST: CPT | Mod: MA

## 2023-04-04 PROCEDURE — 83690 ASSAY OF LIPASE: CPT

## 2023-04-04 PROCEDURE — 85025 COMPLETE CBC W/AUTO DIFF WBC: CPT

## 2023-04-04 PROCEDURE — 80053 COMPREHEN METABOLIC PANEL: CPT

## 2023-04-04 RX ORDER — KETOROLAC TROMETHAMINE 30 MG/ML
1 SYRINGE (ML) INJECTION
Qty: 15 | Refills: 0
Start: 2023-04-04 | End: 2023-04-08

## 2023-04-04 RX ORDER — TAMSULOSIN HYDROCHLORIDE 0.4 MG/1
1 CAPSULE ORAL
Qty: 30 | Refills: 0
Start: 2023-04-04 | End: 2023-05-03

## 2023-04-04 RX ORDER — CEPHALEXIN 500 MG
1 CAPSULE ORAL
Qty: 20 | Refills: 0
Start: 2023-04-04 | End: 2023-04-08

## 2023-04-04 RX ORDER — SODIUM CHLORIDE 9 MG/ML
1000 INJECTION INTRAMUSCULAR; INTRAVENOUS; SUBCUTANEOUS ONCE
Refills: 0 | Status: COMPLETED | OUTPATIENT
Start: 2023-04-04 | End: 2023-04-04

## 2023-04-04 RX ORDER — KETOROLAC TROMETHAMINE 30 MG/ML
30 SYRINGE (ML) INJECTION ONCE
Refills: 0 | Status: DISCONTINUED | OUTPATIENT
Start: 2023-04-04 | End: 2023-04-04

## 2023-04-04 RX ORDER — OXYCODONE HYDROCHLORIDE 5 MG/1
1 TABLET ORAL
Qty: 9 | Refills: 0
Start: 2023-04-04 | End: 2023-04-06

## 2023-04-04 RX ADMIN — Medication 30 MILLIGRAM(S): at 11:55

## 2023-04-04 RX ADMIN — SODIUM CHLORIDE 1000 MILLILITER(S): 9 INJECTION INTRAMUSCULAR; INTRAVENOUS; SUBCUTANEOUS at 11:55

## 2023-04-04 NOTE — ED PROVIDER NOTE - CLINICAL SUMMARY MEDICAL DECISION MAKING FREE TEXT BOX
57 year old male with hx of kidney stones presenting with left flank pain x 1 week. Will obtain labs, urine and CT to r/o kidney stones.

## 2023-04-04 NOTE — CONSULT NOTE ADULT - ASSESSMENT
PALLAVI TUCKER is a 57y Male with left flank pain x1 week a/w dysuria, 7mm L ureteral stone seen on CT with mild left hydro. HDS, Afebrile, no leukocytosis, Cr 1.61    PLAN  - Recommend repeat CTAP with contrast for further evaluation of renal lesions  - Pain control   - OR vs outpt urology f/u     INCOMPLETE to be d/w Attending PALLAVI TUCKER is a 57y Male with left flank pain x1 week a/w dysuria, 7mm L ureteral stone seen on CT with mild left hydro. HDS, Afebrile, no leukocytosis, Cr 1.61    PLAN  - Recommend discharge home with 30 days flomax,  - Keflex x5 days  - Pain control with oxycodone prn  - Follow up next week in clinic with urologist Dr. Teixeira    CAITLINKATHYALBERTAJULIA is a 57y Male with left flank pain x1 week a/w dysuria, 7 mm L ureteral stone seen on CT with mild left hydro. CT also shows 1.4 cm indeterminate R upper pole lesion, as well as 2.9 cm L upper pole renal mass. HDS, Afebrile, no leukocytosis, Cr 1.61 from 1.06 in December 2020.    - Labs reviewed  - CT images reviewed. Discussed 7 mm L distal ureteral stone as well as renal lesions/masses seen on CT with patient.  - Trial of medical expulsive therapy and PO pain control  - Recommend 5 days of Keflex  - Flomax 0.4mg qHS x30 days  - Oxycodone PRN for pain control  - Zofran PRN for nausea  - Aggressive hydration  - Strain urine for calculi  - Return to ER for any fever > 100.4, pain uncontrolled on discharge pain medications, or inability to tolerate PO  - Follow up with Dr. Teixeira in clinic [phone] next week

## 2023-04-04 NOTE — ED PROVIDER NOTE - PROGRESS NOTE DETAILS
urology saw patient. Will follow up outpatient. Medication sent to the pharmacy. Pt is well appearing walking with steady gait, stable for discharge and follow up without fail with medical doctor. I had a detailed discussion with the patient and/or guardian regarding the historical points, exam findings, and any diagnostic results supporting the discharge diagnosis. Pt educated on care and need for follow up. Strict return instructions and red flag signs and symptoms discussed with patient. Questions answered. Pt shows understanding of discharge information and agrees to follow.

## 2023-04-04 NOTE — ED PROVIDER NOTE - NSFOLLOWUPINSTRUCTIONS_ED_ALL_ED_FT
Follow up with Dr. Teixeira within 1 week.     Pain medication sent to the pharmacy. Take as directed.     Antibiotics sent to the pharmacy, take as directed and until all of the medication is completed.     Flomax, which will help you pass the stone, was sent to the pharmacy. take as directed.     You can take motrin/tylenol as needed for pain.    If you experience any new or worsening symptoms or if you are concerned you can always come back to the emergency for a re-evaluation.

## 2023-04-04 NOTE — CONSULT NOTE ADULT - NSCONSULTADDITIONALINFOA_GEN_ALL_CORE
agree with above. labs and imaging reviewed. L 7mm distal ureteral stone with YOSELYN but pain control. Patient prefers trial of medical expulsive therapy.  Additionally has BL renal masses. Left 3mm lesion in the upper pole more concerning.   Will need CT renal mass protocol to better characterize. Will obtain as outpatient for stone follow up   Office information provided to the patient.

## 2023-04-04 NOTE — ED PROVIDER NOTE - CARE PROVIDER_API CALL
Ollie Teixeira)  Urology  [address] [facility], [address]nd Floor  [place], [place] [id number]  Phone: [phone]  Fax: [phone]  Established Patient  Follow Up Time:

## 2023-04-04 NOTE — ED PROVIDER NOTE - OBJECTIVE STATEMENT
57-year-old male with a past medical history of kidney stones and hypertension presents with left flank pain for 1 week.  Ibuprofen helps with the pain.  Patient denies hematuria, dysuria, fevers, nausea, vomiting, diarrhea

## 2023-04-04 NOTE — ED PROVIDER NOTE - PATIENT PORTAL LINK FT
You can access the FollowMyHealth Patient Portal offered by [facility] [place] by registering at the following website: [email or web address]. By joining [facility]’s FollowMyHealth portal, you will also be able to view your health information using other applications (apps) compatible with our system.

## 2023-04-05 LAB
CULTURE RESULTS: NO GROWTH — SIGNIFICANT CHANGE UP
SPECIMEN SOURCE: SIGNIFICANT CHANGE UP

## 2023-04-18 ENCOUNTER — TRANSCRIPTION ENCOUNTER (OUTPATIENT)
Age: 58
End: 2023-04-18

## 2023-04-25 ENCOUNTER — OUTPATIENT (OUTPATIENT)
Dept: OUTPATIENT SERVICES | Facility: HOSPITAL | Age: 58
LOS: 1 days | End: 2023-04-25
Payer: MEDICAID

## 2023-04-25 VITALS
SYSTOLIC BLOOD PRESSURE: 146 MMHG | HEART RATE: 90 BPM | HEIGHT: 67 IN | DIASTOLIC BLOOD PRESSURE: 97 MMHG | RESPIRATION RATE: 16 BRPM | WEIGHT: 184.97 LBS | OXYGEN SATURATION: 98 % | TEMPERATURE: 99 F

## 2023-04-25 DIAGNOSIS — Z87.19 PERSONAL HISTORY OF OTHER DISEASES OF THE DIGESTIVE SYSTEM: Chronic | ICD-10-CM

## 2023-04-25 DIAGNOSIS — N20.0 CALCULUS OF KIDNEY: ICD-10-CM

## 2023-04-25 DIAGNOSIS — Z01.818 ENCOUNTER FOR OTHER PREPROCEDURAL EXAMINATION: ICD-10-CM

## 2023-04-25 DIAGNOSIS — Z87.438 PERSONAL HISTORY OF OTHER DISEASES OF MALE GENITAL ORGANS: Chronic | ICD-10-CM

## 2023-04-25 DIAGNOSIS — I10 ESSENTIAL (PRIMARY) HYPERTENSION: ICD-10-CM

## 2023-04-25 PROCEDURE — 87086 URINE CULTURE/COLONY COUNT: CPT

## 2023-04-25 PROCEDURE — G0463: CPT

## 2023-04-25 NOTE — H&P PST ADULT - NSICDXPROCEDURE_GEN_ALL_CORE_FT
PROCEDURES:  Cystoscopy with left ureteroscopy and left-sided lithotripsy procedure 25-Apr-2023 16:14:52  Lurdes Tarango

## 2023-04-25 NOTE — H&P PST ADULT - PROBLEM SELECTOR PLAN 1
Pt is scheduled for Cystoscopy, left ureteroscopy, Basket extraction, laser lithotripsy and stent placement on 4/27/23

## 2023-04-25 NOTE — H&P PST ADULT - ASSESSMENT
58 y/o male with H/O htn, HLD, and left renal calculus is scheduled for Cystoscopy, left ureteroscopy, Basket extraction, laser lithotripsy and stent placement on 4/27/23

## 2023-04-25 NOTE — H&P PST ADULT - HISTORY OF PRESENT ILLNESS
56 y/o male with kidney stone 2-3 years ago and passed stone in the urine. Pt this time presented with left back pain and was diagnosed with left kidney stone. Pt also has h/o htn. He is scheduled for Cystoscopy, left ureteroscopy, Basket extraction, laser lithotripsy and stent placement on 4/27/23

## 2023-04-26 ENCOUNTER — TRANSCRIPTION ENCOUNTER (OUTPATIENT)
Age: 58
End: 2023-04-26

## 2023-04-26 LAB
CULTURE RESULTS: SIGNIFICANT CHANGE UP
SPECIMEN SOURCE: SIGNIFICANT CHANGE UP

## 2023-04-27 ENCOUNTER — TRANSCRIPTION ENCOUNTER (OUTPATIENT)
Age: 58
End: 2023-04-27

## 2023-04-27 ENCOUNTER — OUTPATIENT (OUTPATIENT)
Dept: OUTPATIENT SERVICES | Facility: HOSPITAL | Age: 58
LOS: 1 days | End: 2023-04-27
Payer: MEDICAID

## 2023-04-27 VITALS — HEIGHT: 67 IN | WEIGHT: 184.97 LBS

## 2023-04-27 VITALS
RESPIRATION RATE: 16 BRPM | SYSTOLIC BLOOD PRESSURE: 143 MMHG | HEART RATE: 88 BPM | TEMPERATURE: 98 F | OXYGEN SATURATION: 100 % | DIASTOLIC BLOOD PRESSURE: 98 MMHG

## 2023-04-27 DIAGNOSIS — Z87.438 PERSONAL HISTORY OF OTHER DISEASES OF MALE GENITAL ORGANS: Chronic | ICD-10-CM

## 2023-04-27 DIAGNOSIS — Z01.818 ENCOUNTER FOR OTHER PREPROCEDURAL EXAMINATION: ICD-10-CM

## 2023-04-27 DIAGNOSIS — N20.0 CALCULUS OF KIDNEY: ICD-10-CM

## 2023-04-27 DIAGNOSIS — Z87.19 PERSONAL HISTORY OF OTHER DISEASES OF THE DIGESTIVE SYSTEM: Chronic | ICD-10-CM

## 2023-04-27 PROCEDURE — 52332 CYSTOSCOPY AND TREATMENT: CPT | Mod: LT

## 2023-04-27 PROCEDURE — 88300 SURGICAL PATH GROSS: CPT

## 2023-04-27 PROCEDURE — C2617: CPT

## 2023-04-27 PROCEDURE — C1889: CPT

## 2023-04-27 PROCEDURE — 82365 CALCULUS SPECTROSCOPY: CPT

## 2023-04-27 PROCEDURE — 52352 CYSTOURETERO W/STONE REMOVE: CPT | Mod: LT

## 2023-04-27 PROCEDURE — 76000 FLUOROSCOPY <1 HR PHYS/QHP: CPT

## 2023-04-27 PROCEDURE — C1769: CPT

## 2023-04-27 DEVICE — LASER FIBER FLEXIVA 365 ID: Type: IMPLANTABLE DEVICE | Site: LEFT | Status: FUNCTIONAL

## 2023-04-27 DEVICE — URETERAL STENT PERCUFLEX PLUS 6FR 24CM: Type: IMPLANTABLE DEVICE | Site: LEFT | Status: FUNCTIONAL

## 2023-04-27 DEVICE — GUIDEWIRE SENSOR DUAL-FLEX NITINOL STRAIGHT .035" X 150CM: Type: IMPLANTABLE DEVICE | Site: LEFT | Status: FUNCTIONAL

## 2023-04-27 DEVICE — STONE BASKET ZEROTIP NITINOL 4-WIRE 1.9FR 120CM X 12MM: Type: IMPLANTABLE DEVICE | Site: LEFT | Status: FUNCTIONAL

## 2023-04-27 RX ORDER — SODIUM CHLORIDE 9 MG/ML
3 INJECTION INTRAMUSCULAR; INTRAVENOUS; SUBCUTANEOUS EVERY 8 HOURS
Refills: 0 | Status: DISCONTINUED | OUTPATIENT
Start: 2023-04-27 | End: 2023-04-27

## 2023-04-27 RX ORDER — LOSARTAN POTASSIUM 100 MG/1
0 TABLET, FILM COATED ORAL
Refills: 0 | DISCHARGE

## 2023-04-27 RX ORDER — TAMSULOSIN HYDROCHLORIDE 0.4 MG/1
1 CAPSULE ORAL
Qty: 10 | Refills: 0
Start: 2023-04-27 | End: 2023-05-06

## 2023-04-27 RX ORDER — SODIUM CHLORIDE 9 MG/ML
1000 INJECTION, SOLUTION INTRAVENOUS
Refills: 0 | Status: DISCONTINUED | OUTPATIENT
Start: 2023-04-27 | End: 2023-05-11

## 2023-04-27 NOTE — ASU DISCHARGE PLAN (ADULT/PEDIATRIC) - CALL YOUR DOCTOR IF YOU HAVE ANY OF THE FOLLOWING:
Pain not relieved by Medications/Nausea and vomiting that does not stop/Unable to urinate/Inability to tolerate liquids or foods Pain not relieved by Medications/Fever greater than (need to indicate Fahrenheit or Celsius)/Nausea and vomiting that does not stop/Unable to urinate/Inability to tolerate liquids or foods

## 2023-04-27 NOTE — ASU DISCHARGE PLAN (ADULT/PEDIATRIC) - CARE PROVIDER_API CALL
Manoj De La Torre)  Urology  [address] [place] [address]  Phone: [phone]  Fax: [phone]  Established Patient  Follow Up Time: 1 week

## 2023-04-27 NOTE — ASU PREOP CHECKLIST - TAMPON REMOVED
Spinal Block    Start time: 11/19/2022 6:50 PM  End time: 11/19/2022 6:55 PM  Performed by: Evan Cabello CRNA  Authorized by: Huey Bundy MD     Pre-procedure:   Indications: primary anesthetic  Preanesthetic Checklist: patient identified, risks and benefits discussed, anesthesia consent, site marked, patient being monitored, timeout performed and fire risk safety assessment completed and verbalized    Timeout Time: 18:50 EST      Spinal Block:   Patient Position:  Right lateral decubitus  Prep Region:  Lumbar  Prep: DuraPrep      Location:  L4-5  Technique:  Single shot  Local: lidocaine (PF) (XYLOCAINE) 10 mg/mL (1 %) IntraDERMAL - IntraDERMal   3 mg - 11/19/2022 6:50:00 PM  bupivacaine (PF) (MARCAINE) 0.5 % (5 mg/mL) intrathecal - Intrathecal   3 mg - 11/19/2022 6:54:00 PM    Med Admin Time: 11/19/2022 6:54 PM    Needle:   Needle Type:  Quincke  Needle Gauge:  22 G  Attempts:  1      Events: CSF confirmed, no blood with aspiration and no paresthesia        Assessment:  Insertion:  Uncomplicated  Patient tolerance:  Patient tolerated the procedure well with no immediate complications
n/a

## 2023-04-27 NOTE — ASU DISCHARGE PLAN (ADULT/PEDIATRIC) - ASU DC SPECIAL INSTRUCTIONSFT
STENT: You may have an internal stent (a hollow tube that runs from the kidney to your bladder) after your procedure, which helps urine drain from the kidney to your bladder. Some patients experience urinary frequency, burning, or even back pain (especially with urination). These sensations will gradually get better. Increasing your fluid intake can also improve these symptoms. While the stent is in place, your urine may continue to be bloody. This stent is temporary and must be removed by your urologist as an outpatient with in 3 months unless otherwise specified. If your stent is on a string, it is secured to your leg or genitalia with an adhesive bandage. Do not pull on the string, do not remove the bandage, do not insert anything intravaginally/intraurethrally, and do not engage in sexual intercourse until after the stent is removed at your post-operative appointment.  GENERAL: It is common to have blood in your urine after your procedure. It may be pink or even red; inform your doctor if you have a significant amount of clot in the urine or if you are unable to void at all. The urine may clear and then become bloody again especially as you are more physically active.  BATHING: You may shower or bathe.  DIET: You may resume your regular diet and regular medication regimen.  PAIN: You may take Tylenol (acetaminophen) 650-975mg and/or Motrin (ibuprofen) 400-600mg, both available over the counter, for pain every 6 hours as needed. Do not exceed 4000mg of Tylenol (acetaminophen) daily. You may alternate these medications such that you take one or the other every 3 hours for around the clock pain coverage. Please continue to take Flomax at bedtime until stent is removed for stent-related discomfort.  ANTIBIOTICS: You have been given a prescription for an antibiotic, please take this medication as instructed and be sure to complete the entire course.  STOOL SOFTENERS: Do not allow yourself to become constipated as straining may cause bleeding. Take stool softeners or a laxative (ex. Miralax, Colace, Senokot, ExLax, etc), available over the counter, if needed.  ACTIVITY: No heavy lifting or strenuous exercise until you are evaluated at your post-operative appointment. Otherwise, you may return to your usual level of physical activity.  FOLLOW-UP: Please follow up next week for stent removal.  CALL YOUR UROLOGIST IF: You have any bleeding that does not stop, inability to void >8 hours, fever over 100.4 F, chills, persistent nausea/vomiting, changes in your incision concerning for infection, or if your pain is not controlled on your discharge pain medications.

## 2023-05-06 LAB
CELL MATERIAL STONE EST-MCNT: SIGNIFICANT CHANGE UP
LABORATORY COMMENT REPORT: SIGNIFICANT CHANGE UP
NIDUS STONE QN: SIGNIFICANT CHANGE UP

## 2023-06-05 LAB — SURGICAL PATHOLOGY STUDY: SIGNIFICANT CHANGE UP

## 2025-04-15 NOTE — ED ADULT TRIAGE NOTE - RESPIRATORY RATE (BREATHS/MIN)
Medication: tikosyn  Last office visit date: 1/23/2025  Medication Refill Protocol Failed.    Potassium, magnesium, and eGFR resulted within last 6 months -- IF CRITERIA FAILED REFER TO PROTOCOL DETAILS     Potassium (mmol/L)   Date Value   05/21/2024 4.6      eGFR Cre: 70 at 5/21/2024  5:03 PM  Calculated from:  Serum Creatinine: 1.04 mg/dL at 5/21/2024  5:03 PM  Age: 85 years  Sex: Male at 5/21/2024  5:03 PM  Calculated using the CKD-EPI Creatinine Equation (2021)         Sent to clinician for review   18

## (undated) DEVICE — PACK CYSTO

## (undated) DEVICE — ELCTR GROUNDING PAD ADULT COVIDIEN

## (undated) DEVICE — GOWN XL

## (undated) DEVICE — GLV 7 PROTEXIS (WHITE)

## (undated) DEVICE — LAP PAD W RING 18 X 18"

## (undated) DEVICE — VENODYNE/SCD SLEEVE CALF MEDIUM

## (undated) DEVICE — SOL IRR BAG NS 0.9% 3000ML

## (undated) DEVICE — GLV 7.5 PROTEXIS (WHITE)

## (undated) DEVICE — SYR LUER LOK 20CC

## (undated) DEVICE — GLV 6.5 PROTEXIS (WHITE)

## (undated) DEVICE — SOL IRR POUR NS 0.9% 1500ML

## (undated) DEVICE — POSITIONER FOAM EGG CRATE ULNAR 2PCS (PINK)

## (undated) DEVICE — WARMING BLANKET UPPER ADULT

## (undated) DEVICE — TUBING RANGER FLUID IRRIGATION SET DISP